# Patient Record
Sex: FEMALE | Race: OTHER | HISPANIC OR LATINO | URBAN - METROPOLITAN AREA
[De-identification: names, ages, dates, MRNs, and addresses within clinical notes are randomized per-mention and may not be internally consistent; named-entity substitution may affect disease eponyms.]

---

## 2022-01-27 ENCOUNTER — EMERGENCY (EMERGENCY)
Facility: HOSPITAL | Age: 62
LOS: 1 days | Discharge: HOME | End: 2022-01-27
Attending: EMERGENCY MEDICINE | Admitting: EMERGENCY MEDICINE
Payer: COMMERCIAL

## 2022-01-27 VITALS
SYSTOLIC BLOOD PRESSURE: 111 MMHG | HEIGHT: 64 IN | HEART RATE: 68 BPM | DIASTOLIC BLOOD PRESSURE: 72 MMHG | RESPIRATION RATE: 18 BRPM | TEMPERATURE: 98 F | WEIGHT: 149.03 LBS | OXYGEN SATURATION: 96 %
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
## 2022-01-27 DIAGNOSIS — T21.22XA BURN OF SECOND DEGREE OF ABDOMINAL WALL, INITIAL ENCOUNTER: ICD-10-CM

## 2022-01-27 DIAGNOSIS — Z23 ENCOUNTER FOR IMMUNIZATION: ICD-10-CM

## 2022-01-27 DIAGNOSIS — Z88.0 ALLERGY STATUS TO PENICILLIN: ICD-10-CM
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
## 2022-01-27 DIAGNOSIS — J45.909 UNSPECIFIED ASTHMA, UNCOMPLICATED: ICD-10-CM

## 2022-01-27 DIAGNOSIS — E11.9 TYPE 2 DIABETES MELLITUS WITHOUT COMPLICATIONS: ICD-10-CM

## 2022-01-27 PROCEDURE — 90471 IMMUNIZATION ADMIN: CPT

## 2022-01-27 PROCEDURE — 90715 TDAP VACCINE 7 YRS/> IM: CPT

## 2022-01-27 PROCEDURE — 99285 EMERGENCY DEPT VISIT HI MDM: CPT | Mod: 25

## 2022-01-27 PROCEDURE — 99284 EMERGENCY DEPT VISIT MOD MDM: CPT | Mod: 25

## 2022-01-27 PROCEDURE — 16000 INITIAL TREATMENT OF BURN(S): CPT

## 2022-01-27 RX ORDER — TETANUS TOXOID, REDUCED DIPHTHERIA TOXOID AND ACELLULAR PERTUSSIS VACCINE, ADSORBED 5; 2.5; 8; 8; 2.5 [IU]/.5ML; [IU]/.5ML; UG/.5ML; UG/.5ML; UG/.5ML
0.5 SUSPENSION INTRAMUSCULAR ONCE
Refills: 0 | Status: COMPLETED | OUTPATIENT
Start: 2022-01-27 | End: 2022-01-27

## 2022-01-27 RX ADMIN — TETANUS TOXOID, REDUCED DIPHTHERIA TOXOID AND ACELLULAR PERTUSSIS VACCINE, ADSORBED 0.5 MILLILITER(S): 5; 2.5; 8; 8; 2.5 SUSPENSION INTRAMUSCULAR at 23:51

## 2022-01-27 RX ADMIN — Medication 1 APPLICATION(S): at 23:56
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
## 2022-01-27 NOTE — ED PROVIDER NOTE - NSFOLLOWUPINSTRUCTIONS_ED_ALL_ED_FT
Apply silvadene cream twice a day for 7-10 days.      Second-Degree Burn    WHAT YOU NEED TO KNOW:    A second-degree burn is also called a partial-thickness burn. A second-degree burn occurs when the first layer and some of the second layer of skin are burned. A superficial second-degree burn usually heals within 2 to 3 weeks with some scarring. A deep second-degree burn can take longer to heal. A second-degree burn can also get worse after a few days and become a third-degree burn.    DISCHARGE INSTRUCTIONS:    Return to the emergency department if:   •You have a fast heartbeat or breathing.    •You are not urinating.    Call your doctor or burn specialist if:   •You have a fever.    •You have increased redness, numbness, or swelling in the burn area.    •Your wound or bandage is leaking pus and has a bad smell.    •Your pain does not get better, or gets worse, even after you take pain medicine.    •You have a dry mouth or eyes.    •You are overly thirsty or tired.    •You have dark yellow urine or urinate less than usual.    •You have a headache or feel dizzy.    •You have questions or concerns about your condition or care.    Medicines:   •Medicines may be given to decrease pain, prevent infection, or help your burn heal. They may be given as a pill or as an ointment applied to your skin.    •Take your medicine as directed. Contact your healthcare provider if you think your medicine is not helping or if you have side effects. Tell him or her if you are allergic to any medicine. Keep a list of the medicines, vitamins, and herbs you take. Include the amounts, and when and why you take them. Bring the list or the pill bottles to follow-up visits. Carry your medicine list with you in case of an emergency.    Burn care:   •Wash your hands with soap and water. Dry your hands with a clean towel or paper towel.    •Remove old bandages. You may need to soak the bandage in water before you remove it so it will not stick to your wound.    •Gently clean the burned area daily with mild soap and water. Pat the area dry. Look for any swelling or redness around the burn. Do not break closed blisters. You may cause a skin infection.    •Apply cream or ointment to the burn with a cotton swab. Place a nonstick bandage over your burn.    •Wrap a layer of gauze around the bandage to hold it in place. The wrap should be snug but not tight. It is too tight if you feel tingling or lose feeling in that area.    •Apply gentle pressure for a few minutes if bleeding occurs.    •Elevate your burned arm or leg above the level of your heart as often as you can. This will help decrease swelling and pain. Prop your burned arm or leg on pillows or blankets to keep it elevated comfortably.    Self-care:   •Drink liquids as directed. You may need to drink extra liquid to help prevent dehydration. Ask how much liquid to drink each day and which liquids are best for you.    •Go to physical therapy, if directed. Your muscles and joints may not work well after a second-degree burn. A physical therapist teaches you exercises to help improve movement and strength, and to decrease pain.    Prevent second-degree burns:   •Do not leave cups, mugs, or bowls containing hot liquids at the edge of a table. Keep pot handles turned away from the stove front.    •Do not leave a lit cigarette. Make sure it is no longer lit. Then dispose of it safely.    •Store dangerous items out of the reach of children. Store cigarette lighters, matches, and chemicals where children cannot reach them. Use child safety latches on the door of the safe storage area.    •Keep your water heater setting to low or medium (90°F to 120°F, or 32°C to 48°C).    •Wear sunscreen that has a sun protectant factor (SPF) of 15 or higher. The sunscreen should also have ultraviolet A (UVA) and ultraviolet B (UVB) protection. Follow the directions on the label when you use sunscreen. Put on more sunscreen if you are in the sun for more than an hour. Reapply sunscreen often if you go swimming or are sweating.    Follow up with your doctor or burn specialist as directed: You may need to return to have your wound checked and your bandage changed. Write down your questions so you remember to ask them during your visits.

## 2022-01-27 NOTE — ED PROVIDER NOTE - PHYSICAL EXAMINATION
abdomen - to left of umbilicus abdomen - to left of umbilicus - 6x6cm area erythema, central skin slough, no bleeding
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
## 2024-10-24 ENCOUNTER — APPOINTMENT (OUTPATIENT)
Dept: OBGYN | Facility: CLINIC | Age: 64
End: 2024-10-24